# Patient Record
Sex: MALE | Race: BLACK OR AFRICAN AMERICAN | NOT HISPANIC OR LATINO | Employment: UNEMPLOYED | URBAN - METROPOLITAN AREA
[De-identification: names, ages, dates, MRNs, and addresses within clinical notes are randomized per-mention and may not be internally consistent; named-entity substitution may affect disease eponyms.]

---
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
## 2024-01-12 ENCOUNTER — TELEPHONE (OUTPATIENT)
Dept: FAMILY MEDICINE | Facility: CLINIC | Age: 19
End: 2024-01-12
Payer: COMMERCIAL

## 2024-01-12 NOTE — TELEPHONE ENCOUNTER
Summary:    Patient is due/failing the following:   PHYSICAL    Reviewed:    [] CARE EVERYWHERE  [] LAST OV NOTE   [] FYI TAB  [] MYCHART ACTIVE?  [] LAST PANEL ENCOUNTER  [] FUTURE APPTS  [] IMMUNIZATIONS  [] Media Tab  Action needed:   Patient needs office visit for physical.    Type of outreach:    Sent letter.                                                                               Kaitlin Thibodeaux CMA

## 2024-01-12 NOTE — LETTER
January 12, 2024      Randy Hart  [address]  [facility] [address]        Dear Randy,     [facility] cares about your health and your health plan.  I have reviewed your medical conditions, medication list and lab results, and am making recommendations based on this review to better manage your health.    You are in particular need of attention regarding:  -Immunizations  -Wellness (Physical) Visit     I am recommending that you:     -schedule a WELLNESS (Physical) APPOINTMENT with me.   I will check fasting labs the same day - nothing to eat except water and meds for 8-10 hours prior.    Please call us at the [facility] or use [employer] to address the above recommendations. [phone]    Thank you for trusting [facility].  We appreciate the opportunity to serve you and look forward to supporting your Healthcare in the future.    If you have (or plan to have) any of these tests done at a facility other than a [facility] or a [facility], please have the results sent to the  [facility] location noted above.      Best Regards,     [facility] Staff       Sincerely,        Alex Angel MD

## 2024-01-27 ENCOUNTER — HOSPITAL ENCOUNTER (EMERGENCY)
Facility: CLINIC | Age: 19
Discharge: HOME | End: 2024-01-27
Attending: EMERGENCY MEDICINE | Admitting: EMERGENCY MEDICINE
Payer: COMMERCIAL

## 2024-01-27 VITALS
RESPIRATION RATE: 18 BRPM | OXYGEN SATURATION: 100 % | SYSTOLIC BLOOD PRESSURE: 132 MMHG | HEART RATE: 86 BPM | TEMPERATURE: 98.6 F | DIASTOLIC BLOOD PRESSURE: 89 MMHG

## 2024-01-27 DIAGNOSIS — K08.89 PAIN, DENTAL: ICD-10-CM

## 2024-01-27 DIAGNOSIS — K05.30 PERICORONITIS: ICD-10-CM

## 2024-01-27 PROCEDURE — 99283 EMERGENCY DEPT VISIT LOW MDM: CPT

## 2024-01-27 PROCEDURE — 250N000013 HC RX MED GY IP 250 OP 250 PS 637: Performed by: EMERGENCY MEDICINE

## 2024-01-27 RX ORDER — PENICILLIN V POTASSIUM 500 MG/1
500 TABLET, FILM COATED ORAL 4 TIMES DAILY
Qty: 40 TABLET | Refills: 0 | Status: SHIPPED | OUTPATIENT
Start: 2024-01-27 | End: 2024-02-06

## 2024-01-27 RX ORDER — OXYCODONE HYDROCHLORIDE 5 MG/1
5 TABLET ORAL ONCE
Status: COMPLETED | OUTPATIENT
Start: 2024-01-27 | End: 2024-01-27

## 2024-01-27 RX ADMIN — OXYCODONE HYDROCHLORIDE 5 MG: 5 TABLET ORAL at 07:37

## 2024-01-27 ASSESSMENT — ACTIVITIES OF DAILY LIVING (ADL): ADLS_ACUITY_SCORE: 33

## 2024-01-27 NOTE — ED TRIAGE NOTES
Patient presents with tooth pain.  States that it is lower right back tooth and that it has been hurting for some time but got more severe last night.     Triage Assessment (Adult)       Row Name 01/27/24 0648          Triage Assessment    Airway WDL WDL        Respiratory WDL    Respiratory WDL WDL        Skin Circulation/Temperature WDL    Skin Circulation/Temperature WDL WDL        Cardiac WDL    Cardiac WDL WDL        Peripheral/Neurovascular WDL    Peripheral Neurovascular WDL WDL        Cognitive/Neuro/Behavioral WDL    Cognitive/Neuro/Behavioral WDL WDL

## 2024-01-27 NOTE — DISCHARGE INSTRUCTIONS
Discharge Instructions  Dental Pain    You have been seen today for a toothache. Your pain may be caused by an exposed nerve, an infection (pulpitis), a root abscess (pocket of pus), or other problems. You will need to see a dentist for a solution to your tooth problem. Emergency Department care is only to help control your problem until you can see a dentist; we cannot provide complete dental care.  Today, we did not find any sign that your toothache was caused by any dangerous or life-threatening condition, but sometimes symptoms develop over time and cannot be found during an emergency visit, so it is very important that you follow up with your dentist.      Generally, every Emergency Department visit should have a follow-up clinic visit with either a primary or a specialty clinic/provider. Please follow-up as instructed by your emergency provider today.    Return to the Emergency Department if:  You develop a new fever over 100.4 F.  You cannot open your mouth normally, cannot move your tongue well, or cannot swallow.  You have new or increased swelling of your face or neck.  You develop drainage of pus or foul smelling material from around your tooth.  What can I do to help myself?  Take any antibiotic the provider may have prescribed for you today.  Avoid very hot or very cold foods as both can cause pain.  Make an appointment to see a dentist as soon as possible. Dentists are generally not  on-staff  at hospitals so we cannot  refer  to you to dentist but we may be able to provide a list of dental clinics to help you.  If you were given a prescription for medicine here today, be sure to read all of the information (including the package insert) that comes with your prescription.  This will include important information about the medicine, its side effects, and any warnings that you need to know about.  The pharmacist who fills the prescription can provide more information and answer questions you may have  about the medicine.  If you have questions or concerns that the pharmacist cannot address, please call or return to the Emergency Department.   Remember that you can always come back to the Emergency Department if you are not able to see your regular provider in the amount of time listed above, if you get any new symptoms, or if there is anything that worries you.    Emergency Dental Care  [email or web address]   [address]   Directions   [phone]    [facility]  [email or web address]  Emergency Dental Clinic You Can Rely On. Open 24 Hours. Call Now.  [address] [address] [address], [place]   Directions   [phone]    [facility]  Address: [facility] [address] [address]   Phone: [phone]    [facility]  [address] [place] [address] [phone]  E-mail: [email or web address]  Website      [facility]  ([facility])  [address] [facility] [address]  [place], [place]  [id number] [phone]  Website    [facility]  [address] [place], [place] [address] [phone]  Website    [facility]  [address] [facility]  [place] [address]  Main: [phone]  Appointments: [phone]  Website    [facility] (Dental cleaning,  deep cleaning - periodontal therapy  and x-rays)  [address] [place] [address]  [phone]    [facility]  [address] [place], [place]  [id number] [phone]  Website    [facility] (Sliding fee scale dental services)  [address] [place] [address]  [phone]    [facility] (Dental cleaning,  deep cleaning- periodontal therapy  and x-rays)  [address] [place], [place] [id number] [phone]    [facility] (Multiple locations -- call for details)  [address] [place], [place] [address] [phone]  Website    [facility]  Dental Care [facility]  [address] [place] [address]  [phone]  Website    [facility]  [address] [place] [address]  [phone]  Website    [facility]  [address] [facility] [place] [address]  [phone]  Website    [facility]  [address] [place] [address]  [phone]  Website    [facility]  [address] [address]  [phone]  Website    [facility]  [address] [place] [address]  [phone]  Website    [facility]  Dr. Basilio Vargas  [facility]  [address] [place] [address]  [phone]  Website    [facility]  [address] [place] [address]  [phone]  Website    [facility]  [address] [place] [address]  [phone]  Website    [facility] Hygiene Clinic (Dental cleaning,  deep cleaning- periodontal therapy  and x-rays)  [address] [place] [address]  [phone]    [facility]  [address] [place] [address]  [phone]  Website    [facility] (open to everyone)  [address] [place] [address]  [phone]  Website    [facility] (Sliding fee scale dental services and some state medical assistance programs accepted)  [address] [place] [address]  [phone]         [facility]  [address] [facility]  [place] [address]  [phone]  Website    [facility]  [address] [place], [place] [address] [phone]  E-mail: [email or web address]  Website     [facility] (Sliding fee scale dental services for all)  [address] [place] [address]  [phone]  Website    [facility] (Dental  cleaning,  deep cleaning- periodontal therapy  and x-rays).  [address] [place] [address]  [phone]  Website    [facility]  [address] [place] [address]  [phone]  Website    [facility]  [address] [place]  [address]  [phone]  Website    [facility]  [address] [place] [address]  [phone]  Website    [facility]  [address] [place] [address]  [phone]  Website    [facility]  Dental clinic for children with special needs. Accepts MA (must have diagnosed medical condition, i.e.:  autism, CP, chronic disease or condition)  [address]  [place]  [address]  [phone]  Website    [facility]  [address] [place] [address]  [phone]  Website    [facility]  [address] [place] [address]  [phone]  Website    [facility]  [address] [place]  [address]  [phone]  Website    [facility]  Discount plan available.  Call for details.  [address] [place], [place] [address] [phone]    [facility] (Dental services provided by mobile dental unit)  [facility]  [address] [address]  [phone]    [facility] (Dr. Jefe Mei - specific criteria and medical necessity required)  [facility] Professional Building, 2nd Floor, [address]  [address] [place] [address]  [phone]  Website    [facility]  [address] [place] [address] [place]  [phone] (main clinic)  Website  After Hours: Adult emergencies [phone] Pediatric emergencies [phone]     Dental Center  [address] [place] [address]  [phone]  Website    [facility]  [address] [place] [address]  [phone]  Website    Dental Clinics Accepting MA Clients    [facility]    Child and Teen Checkups  [facility]  [phone]    [facility]  [address] [facility] [address]  [place], [place]  [phone]    [facility]  [address] [place], [place]  [phone]    [facility]  [address] [facility]  [phone]    [facility]  [address] [facility]  [phone]     of  Dental School  [address] [facility]  [phone]    [facility]  [address] [facility]  [phone]    [facility]  [address] [facility]  [phone]    [place] [facility]  [place]  [phone]    [address]  [phone]    [facility]  [address] [facility]  [phone]    [facility]  [address] [place] [address]  [phone]    [facility]  [address] [facility]  [phone]    [facility]  [address] [facility]  [phone]    ADDITIONAL RESOURCES    [facility]  [phone]    [facility]  [phone]    [facility]  [phone]    This web site contains many locations and information about what services they provide:    [email or web address]

## 2024-01-27 NOTE — ED PROVIDER NOTES
History     Chief Complaint:  Dental Pain    The history is provided by the patient.      Randy Hart is a 18 year old male presenting with dental pain in the lower back tooth. Patient has had the pain for a while but worsened overnight. He didn't sleep well because of this. He is unable to swallow. They were going to present to the dentist, but they did not open until 0800. He has not previously seen a dentist for this problem. Patient's voice is not different.    Independent Historian:   Mom present at bedside and corroborates the above.     Review of External Notes:        Medications:    No current outpatient medications on file.      Past Medical History:    No past medical history on file.    Past Surgical History:    No past surgical history on file.     Physical Exam   Patient Vitals for the past 24 hrs:   BP Temp Temp src Pulse Resp SpO2   01/27/24 0647 (!) 139/91 98  F (36.7  C) Temporal 77 16 100 %      Physical Exam  Constitutional: Well appearing.  HEENT: Atraumatic.  PERRL.  EOMI. Right maxillary last molar exhibits pericoronitis without gingival swelling or drainable fluid collection presenting with fluctuance.  Floor the mouth is soft.  Oropharynx without erythema or exudate.  No peritonsillar abscess.  No trismus or phonation change.  Moist mucous membranes.  Neck: Soft.  Supple.  No masses or swelling.  Cardiac: Regular rate and rhythm.  No murmur or rub.  Respiratory: Clear to auscultation bilaterally.  No respiratory distress.   Musculoskeletal: No edema.  Normal range of motion.  Neurologic: Alert and oriented.  Normal tone and bulk.   Normal gait.  Skin: No rashes.  No edema.  Psych: Normal affect.  Normal behavior.            Emergency Department Course     Imaging:  No orders to display      Laboratory:  Labs Ordered and Resulted from Time of ED Arrival to Time of ED Departure - No data to display     Emergency Department Course & Assessments:    Interventions:  Medications   oxyCODONE  (ROXICODONE) tablet 5 mg (has no administration in time range)      Independent Interpretation (X-rays, CTs, rhythm strip):  No imaging completed    Assessments/Consultations/Discussion of Management or Tests:  ED Course as of 01/27/24 0718   Sat Jan 27, 2024   0711 I obtained the history and examined the patient as noted above.        Social Determinants of Health affecting care:   None    Disposition:  The patient was discharged to home.     Impression & Plan      Medical Decision Making:  Is a 28-year-old man who is afebrile and hemodynamically stable.  No drainable fluid collection.  No trismus, phonation change, or other concerningAnas A Tanner findings for deep space infection negation for imaging or blood work.  He has a pericoronitis of the right last molar on the maxillary right lower portion.  Again no drainable fluid collection.  Discussed plan for home with antibiotics and close dental follow-up.  Discussed supportive care at home and strict return precautions were given.  The questions were answered and he was in no distress at time of discharge.    Diagnosis:    ICD-10-CM    1. Pain, dental  K08.89       2. Pericoronitis  K05.30          Discharge Medications:  Discharge Medication List as of 1/27/2024  7:40 AM        START taking these medications    Details   penicillin V (VEETID) 500 MG tablet Take 1 tablet (500 mg) by mouth 4 times daily for 10 days, Disp-40 tablet, R-0, E-Prescribe            Scribe Disclosure:  Kira HENDRIX, am serving as a scribe at 6:57 AM on 1/27/2024 to document services personally performed by Fred Young MD based on my observations and the provider's statements to me.  1/27/2024   Fred Young MD Salay, Nicholas J, MD  02/05/24 7210